# Patient Record
Sex: FEMALE | Race: OTHER
[De-identification: names, ages, dates, MRNs, and addresses within clinical notes are randomized per-mention and may not be internally consistent; named-entity substitution may affect disease eponyms.]

---

## 2019-11-15 PROBLEM — Z00.00 ENCOUNTER FOR PREVENTIVE HEALTH EXAMINATION: Status: ACTIVE | Noted: 2019-11-15

## 2019-11-22 ENCOUNTER — APPOINTMENT (OUTPATIENT)
Dept: NEUROLOGY | Facility: CLINIC | Age: 34
End: 2019-11-22
Payer: MEDICAID

## 2019-11-22 ENCOUNTER — TRANSCRIPTION ENCOUNTER (OUTPATIENT)
Age: 34
End: 2019-11-22

## 2019-11-22 VITALS
DIASTOLIC BLOOD PRESSURE: 78 MMHG | BODY MASS INDEX: 30.12 KG/M2 | HEART RATE: 94 BPM | HEIGHT: 63 IN | OXYGEN SATURATION: 98 % | WEIGHT: 170 LBS | SYSTOLIC BLOOD PRESSURE: 117 MMHG

## 2019-11-22 DIAGNOSIS — Z87.42 PERSONAL HISTORY OF OTHER DISEASES OF THE FEMALE GENITAL TRACT: ICD-10-CM

## 2019-11-22 DIAGNOSIS — Z78.9 OTHER SPECIFIED HEALTH STATUS: ICD-10-CM

## 2019-11-22 DIAGNOSIS — Z82.49 FAMILY HISTORY OF ISCHEMIC HEART DISEASE AND OTHER DISEASES OF THE CIRCULATORY SYSTEM: ICD-10-CM

## 2019-11-22 DIAGNOSIS — Z86.69 PERSONAL HISTORY OF OTHER DISEASES OF THE NERVOUS SYSTEM AND SENSE ORGANS: ICD-10-CM

## 2019-11-22 DIAGNOSIS — Z83.3 FAMILY HISTORY OF DIABETES MELLITUS: ICD-10-CM

## 2019-11-22 DIAGNOSIS — Z80.1 FAMILY HISTORY OF MALIGNANT NEOPLASM OF TRACHEA, BRONCHUS AND LUNG: ICD-10-CM

## 2019-11-22 DIAGNOSIS — D35.2 BENIGN NEOPLASM OF PITUITARY GLAND: ICD-10-CM

## 2019-11-22 DIAGNOSIS — Z87.898 PERSONAL HISTORY OF OTHER SPECIFIED CONDITIONS: ICD-10-CM

## 2019-11-22 DIAGNOSIS — M54.16 RADICULOPATHY, LUMBAR REGION: ICD-10-CM

## 2019-11-22 DIAGNOSIS — M54.12 RADICULOPATHY, CERVICAL REGION: ICD-10-CM

## 2019-11-22 PROCEDURE — 99203 OFFICE O/P NEW LOW 30 MIN: CPT

## 2019-11-22 NOTE — ASSESSMENT
[FreeTextEntry1] : At this time she will proceed with MRI of her head including pituitary ENT referral endocrine evaluation and ophthalmology referral. She will then see her head and neck surgeon to determine whether any surgery is medically advisable.\par \par Related to her neck and low back symptoms she will have EMG studies performed in her upper and lower extremities. She has no evidence on clinical exam indicate myelopathy she may have an underlying radiculopathy that will be investigated with EMG testing.

## 2019-11-22 NOTE — HISTORY OF PRESENT ILLNESS
[FreeTextEntry1] : 32 through woman with a history of a pituitary tumor she is now having visual complaints. She has had in addition MRIs of the cervical lumbar spine for which I have reviewed. 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 C. multiple copies of MRIs including her brain cervical and lumbar spine she's had EMG studies in the past showing evidence of lumbar radiculopathy.

## 2019-11-22 NOTE — CONSULT LETTER
[Dear  ___] : Dear  [unfilled], [Courtesy Letter:] : I had the pleasure of seeing your patient, [unfilled], in my office today. [Please see my note below.] : Please see my note below. [Sincerely,] : Sincerely, [FreeTextEntry3] : Bruno Machuca MD\par  Professor of Neurology\par  Director of Neurology Outreach Programs \par  [facility]\par

## 2019-11-22 NOTE — PHYSICAL EXAM
[FreeTextEntry1] : The patient is awake alert answering appropriately without evidence of a facial dysarthria.\par \par Cranial nerves show full extraocular movements fundi benign is no facial asymmetry lower cranial nerves are normal.\par \par The gait appears to be normal reflexes coordination sensation mental status are all within normal limits without evidence of cranial or carotid bruits.

## 2019-12-09 ENCOUNTER — APPOINTMENT (OUTPATIENT)
Dept: OTOLARYNGOLOGY | Facility: CLINIC | Age: 34
End: 2019-12-09

## 2022-09-29 ENCOUNTER — EMERGENCY (EMERGENCY)
Facility: HOSPITAL | Age: 37
LOS: 1 days | Discharge: HOME | End: 2022-09-29
Attending: EMERGENCY MEDICINE | Admitting: EMERGENCY MEDICINE

## 2022-09-29 VITALS
DIASTOLIC BLOOD PRESSURE: 80 MMHG | OXYGEN SATURATION: 98 % | TEMPERATURE: 98 F | HEART RATE: 93 BPM | RESPIRATION RATE: 18 BRPM | WEIGHT: 184.97 LBS | SYSTOLIC BLOOD PRESSURE: 131 MMHG

## 2022-09-29 VITALS
SYSTOLIC BLOOD PRESSURE: 128 MMHG | HEART RATE: 76 BPM | RESPIRATION RATE: 18 BRPM | OXYGEN SATURATION: 98 % | DIASTOLIC BLOOD PRESSURE: 78 MMHG

## 2022-09-29 DIAGNOSIS — R10.9 UNSPECIFIED ABDOMINAL PAIN: ICD-10-CM

## 2022-09-29 DIAGNOSIS — N12 TUBULO-INTERSTITIAL NEPHRITIS, NOT SPECIFIED AS ACUTE OR CHRONIC: ICD-10-CM

## 2022-09-29 DIAGNOSIS — Z20.822 CONTACT WITH AND (SUSPECTED) EXPOSURE TO COVID-19: ICD-10-CM

## 2022-09-29 LAB
ANION GAP SERPL CALC-SCNC: 8 MMOL/L — LOW (ref 9–16)
APPEARANCE UR: CLEAR — SIGNIFICANT CHANGE UP
BACTERIA # UR AUTO: ABNORMAL /HPF
BILIRUB UR-MCNC: NEGATIVE — SIGNIFICANT CHANGE UP
BUN SERPL-MCNC: 12 MG/DL — SIGNIFICANT CHANGE UP (ref 7–23)
CALCIUM SERPL-MCNC: 8.6 MG/DL — SIGNIFICANT CHANGE UP (ref 8.5–10.5)
CHLORIDE SERPL-SCNC: 105 MMOL/L — SIGNIFICANT CHANGE UP (ref 96–108)
CO2 SERPL-SCNC: 26 MMOL/L — SIGNIFICANT CHANGE UP (ref 22–31)
COLOR SPEC: YELLOW — SIGNIFICANT CHANGE UP
CREAT SERPL-MCNC: 0.95 MG/DL — SIGNIFICANT CHANGE UP (ref 0.5–1.3)
DIFF PNL FLD: ABNORMAL
EGFR: 80 ML/MIN/1.73M2 — SIGNIFICANT CHANGE UP
GLUCOSE SERPL-MCNC: 98 MG/DL — SIGNIFICANT CHANGE UP (ref 70–99)
GLUCOSE UR QL: NEGATIVE — SIGNIFICANT CHANGE UP
HCG UR QL: NEGATIVE — SIGNIFICANT CHANGE UP
HCT VFR BLD CALC: 39.6 % — SIGNIFICANT CHANGE UP (ref 34.5–45)
HGB BLD-MCNC: 13 G/DL — SIGNIFICANT CHANGE UP (ref 11.5–15.5)
KETONES UR-MCNC: ABNORMAL MG/DL
LEUKOCYTE ESTERASE UR-ACNC: ABNORMAL
MCHC RBC-ENTMCNC: 29.4 PG — SIGNIFICANT CHANGE UP (ref 27–34)
MCHC RBC-ENTMCNC: 32.8 GM/DL — SIGNIFICANT CHANGE UP (ref 32–36)
MCV RBC AUTO: 89.6 FL — SIGNIFICANT CHANGE UP (ref 80–100)
NITRITE UR-MCNC: NEGATIVE — SIGNIFICANT CHANGE UP
NRBC # BLD: 0 /100 WBCS — SIGNIFICANT CHANGE UP (ref 0–0)
PH UR: 7 — SIGNIFICANT CHANGE UP (ref 5–8)
PLATELET # BLD AUTO: 219 K/UL — SIGNIFICANT CHANGE UP (ref 150–400)
POTASSIUM SERPL-MCNC: 4 MMOL/L — SIGNIFICANT CHANGE UP (ref 3.5–5.3)
POTASSIUM SERPL-SCNC: 4 MMOL/L — SIGNIFICANT CHANGE UP (ref 3.5–5.3)
PROT UR-MCNC: 100 MG/DL
RBC # BLD: 4.42 M/UL — SIGNIFICANT CHANGE UP (ref 3.8–5.2)
RBC # FLD: 12.7 % — SIGNIFICANT CHANGE UP (ref 10.3–14.5)
RBC CASTS # UR COMP ASSIST: SIGNIFICANT CHANGE UP /HPF
SARS-COV-2 RNA SPEC QL NAA+PROBE: SIGNIFICANT CHANGE UP
SODIUM SERPL-SCNC: 139 MMOL/L — SIGNIFICANT CHANGE UP (ref 132–145)
SP GR SPEC: 1.02 — SIGNIFICANT CHANGE UP (ref 1–1.03)
UROBILINOGEN FLD QL: 0.2 E.U./DL — SIGNIFICANT CHANGE UP
WBC # BLD: 13.45 K/UL — HIGH (ref 3.8–10.5)
WBC # FLD AUTO: 13.45 K/UL — HIGH (ref 3.8–10.5)
WBC UR QL: ABNORMAL /HPF

## 2022-09-29 PROCEDURE — 76770 US EXAM ABDO BACK WALL COMP: CPT | Mod: 26

## 2022-09-29 PROCEDURE — 99285 EMERGENCY DEPT VISIT HI MDM: CPT

## 2022-09-29 RX ORDER — CEFTRIAXONE 500 MG/1
1000 INJECTION, POWDER, FOR SOLUTION INTRAMUSCULAR; INTRAVENOUS ONCE
Refills: 0 | Status: COMPLETED | OUTPATIENT
Start: 2022-09-29 | End: 2022-09-29

## 2022-09-29 RX ORDER — CEFUROXIME AXETIL 250 MG
1 TABLET ORAL
Qty: 20 | Refills: 0
Start: 2022-09-29 | End: 2022-10-08

## 2022-09-29 RX ORDER — SODIUM CHLORIDE 9 MG/ML
1000 INJECTION INTRAMUSCULAR; INTRAVENOUS; SUBCUTANEOUS ONCE
Refills: 0 | Status: COMPLETED | OUTPATIENT
Start: 2022-09-29 | End: 2022-09-29

## 2022-09-29 RX ORDER — PHENAZOPYRIDINE HCL 100 MG
200 TABLET ORAL ONCE
Refills: 0 | Status: COMPLETED | OUTPATIENT
Start: 2022-09-29 | End: 2022-09-29

## 2022-09-29 RX ORDER — ONDANSETRON 8 MG/1
1 TABLET, FILM COATED ORAL
Qty: 12 | Refills: 0
Start: 2022-09-29 | End: 2022-10-02

## 2022-09-29 RX ORDER — PHENAZOPYRIDINE HCL 100 MG
1 TABLET ORAL
Qty: 4 | Refills: 0
Start: 2022-09-29 | End: 2022-09-30

## 2022-09-29 RX ORDER — ACETAMINOPHEN 500 MG
650 TABLET ORAL ONCE
Refills: 0 | Status: COMPLETED | OUTPATIENT
Start: 2022-09-29 | End: 2022-09-29

## 2022-09-29 RX ORDER — KETOROLAC TROMETHAMINE 30 MG/ML
15 SYRINGE (ML) INJECTION ONCE
Refills: 0 | Status: DISCONTINUED | OUTPATIENT
Start: 2022-09-29 | End: 2022-09-29

## 2022-09-29 RX ORDER — ONDANSETRON 8 MG/1
4 TABLET, FILM COATED ORAL ONCE
Refills: 0 | Status: COMPLETED | OUTPATIENT
Start: 2022-09-29 | End: 2022-09-29

## 2022-09-29 RX ADMIN — ONDANSETRON 4 MILLIGRAM(S): 8 TABLET, FILM COATED ORAL at 12:13

## 2022-09-29 RX ADMIN — Medication 200 MILLIGRAM(S): at 12:51

## 2022-09-29 RX ADMIN — SODIUM CHLORIDE 1000 MILLILITER(S): 9 INJECTION INTRAMUSCULAR; INTRAVENOUS; SUBCUTANEOUS at 11:41

## 2022-09-29 RX ADMIN — CEFTRIAXONE 100 MILLIGRAM(S): 500 INJECTION, POWDER, FOR SOLUTION INTRAMUSCULAR; INTRAVENOUS at 12:52

## 2022-09-29 RX ADMIN — Medication 15 MILLIGRAM(S): at 12:52

## 2022-09-29 RX ADMIN — Medication 650 MILLIGRAM(S): at 12:14

## 2022-09-29 NOTE — ED PROVIDER NOTE - NS ED ROS FT
Constitutional: +fever, +generalized malaise  HEENT:  No change in vision, no discharge, no congestion  Cardiac:  No chest pain, palpitations  Pulm:  No cough, no sob  GI:  No abd pain, +nausea +right flank pain  : +dysuria +hematuria   Neuro:  No ha, no neck pain, no numbness, no weakness, no change in speech, vision or gait  MSK:  No joint pain or swelling  Skin:  No rash  Heme:  No abnormal bruising  Psychiatric: No suicidal or homicidal ideation, no hallucinations

## 2022-09-29 NOTE — ED PROVIDER NOTE - NSFOLLOWUPINSTRUCTIONS_ED_ALL_ED_FT
SEE YOUR DOCTOR MONDAY     TAKE ALL OF THE ANTIBIOTICS EVEN IF YOU FEEL BETTER IN 2-3 DAYS    TAKE MOTRIN 600 MG EVERY 8 HOURS AS NEEDED FOR PAIN.  YOU CAN ALSO TAKE TYLENOL ACCORDING TO THE BOTTLE INSTRUCTIONS.    DRINK PLENTY OF FLUIDS AND REST AS NEEDED    RETURN TO THE EMERGENCY ROOM IMMEDIATELY FOR:  FEVER  VOMITING  WORSENING PAIN  SHAKING CHILLS  YOU FEEL WEAK, DIZZY OR ILL       Pyelonephritis, Adult    Body outline showing the urinary system, with a close-up of a normal kidney and an infected kidney.   Pyelonephritis is an infection that occurs in the kidney. The kidneys are the organs that filter a person's blood and move waste out of the bloodstream and into the urine. Urine passes from the kidneys, through tubes called ureters, and into the bladder. There are two main types of pyelonephritis:  •Infections that come on quickly without any warning (acute pyelonephritis).      •Infections that last for a long period of time (chronic pyelonephritis).      In most cases, the infection clears up with treatment and does not cause further problems. More severe infections or chronic infections can sometimes spread to the bloodstream or lead to other problems with the kidneys.      What are the causes?    This condition is usually caused by:  •Bacteria traveling from the bladder up to the kidney. This may occur after having a bladder infection (cystitis) or urinary tract infection (UTI).      •Bladder infections caused from bacteria traveling from the bloodstream to the kidney.        What increases the risk?    This condition is more likely to develop in:  •Pregnant women.      •Older people.    •People who have any of these conditions:  •Diabetes.      •Inflammation of the prostate gland (prostatitis), in males.      •Kidney stones or bladder stones.      •Other abnormalities of the kidney or ureter.      •Cancer.        •People who have a catheter placed in the bladder.      •People who are sexually active.      •Women who use spermicides.      •People who have had a prior UTI.        What are the signs or symptoms?    Symptoms of this condition include:  •Frequent urination.      •Strong or persistent urge to urinate.      •Burning or stinging when urinating.      •Abdominal pain.      •Back pain.      •Pain in the side or flank area.      •Fever or chills.      •Blood in the urine, or dark urine.      •Nausea or vomiting.        How is this diagnosed?    This condition may be diagnosed based on:  •Your medical history and a physical exam.      •Urine tests.      •Blood tests.      You may also have imaging tests of the kidneys, such as an ultrasound or CT scan.      How is this treated?    Treatment for this condition may depend on the severity of the infection.  •If the infection is mild and is found early, you may be treated with antibiotic medicines taken by mouth (orally). You will need to drink fluids to remain hydrated.      •If the infection is more severe, you may need to stay in the hospital and receive antibiotics given directly into a vein through an IV. You may also need to receive fluids through an IV if you are not able to remain hydrated. After your hospital stay, you may need to take oral antibiotics for a period of time.      Other treatments may be required, depending on the cause of the infection.      Follow these instructions at home:    Medicines     •Take your antibiotic medicine as told by your health care provider. Do not stop taking the antibiotic even if you start to feel better.      •Take over-the-counter and prescription medicines only as told by your health care provider.        General instructions   Three cups showing dark yellow, yellow, and pale yellow urine.   •Drink enough fluid to keep your urine pale yellow.      •Avoid caffeine, tea, and carbonated beverages. They tend to irritate the bladder.      •Urinate often. Avoid holding in urine for long periods of time.      •Urinate before and after sex.      •After a bowel movement, women should cleanse from front to back. Use each tissue only once.      •Keep all follow-up visits as told by your health care provider. This is important.        Contact a health care provider if:    •Your symptoms do not get better after 2 days of treatment.      •Your symptoms get worse.      •You have a fever.        Get help right away if you:    •Are unable to take your antibiotics or fluids.      •Have shaking chills.      •Vomit.      •Have severe flank or back pain.      •Have extreme weakness or fainting.        Summary    •Pyelonephritis is a urinary tract infection (UTI) that occurs in the kidney.      •Treatment for this condition may depend on the severity of the infection.      •Take your antibiotic medicine as told by your health care provider. Do not stop taking the antibiotic even if you start to feel better.      •Drink enough fluid to keep your urine pale yellow.      •Keep all follow-up visits as told by your health care provider. This is important.      This information is not intended to replace advice given to you by your health care provider. Make sure you discuss any questions you have with your health care provider.    ANY NEW, WORSENING OR CONCERNING SYMPTOMS

## 2022-09-29 NOTE — ED PROVIDER NOTE - PATIENT PORTAL LINK FT
You can access the FollowMyHealth Patient Portal offered by [facility] by registering at the following website: [email or web address]. By joining [employer]’s FollowMyHealth portal, you will also be able to view your health information using other applications (apps) compatible with our system.

## 2022-09-29 NOTE — ED PROVIDER NOTE - PHYSICAL EXAMINATION
General:  Well appearing, no respiratory distress  HEENT:  No conjunctival injection, no ocular discharge, external ears WNL, no pharyngeal injection or exudates  Chest:  Non-tender, no crepitance  Lungs:  Clear to auscultation bilaterally   Heart:  s1s2 normal, no murmur  Abdomen:  soft, suprapubic tenderness  Back: Mild right CVAT  :  Deferred  Rectal:  Deferred  Extremities: No edema, normal perfusion, no joint swelling or tenderness  Neuro:  Alert and oriented x 3, cn2-12 intact, full strength, sensory grossly intact, normal gait  Psychiatry:  Calm, cooperative, no expression of suicidal or homicidal ideation

## 2022-09-29 NOTE — ED PROVIDER NOTE - CLINICAL SUMMARY MEDICAL DECISION MAKING FREE TEXT BOX
38 yo F with dysuria, hematuria and flank pain.  Symptoms most c/w pyelonephritis.  will give meds for symptom relief, check studies, reassess.    Discussed results, dx, treatment and fu plan in detail with patient.  Advised return for worsening pain, vomiting, problems taking the medicine or any other new, worsening or concerning symptoms and patient verbalized understanding of discussion and plan.

## 2022-09-29 NOTE — ED PROVIDER NOTE - OBJECTIVE STATEMENT
Patient has been having dysuria, hematuria right flank pain  Symptoms started yesterday, worse today  Subjective fever  Nausea

## 2022-09-29 NOTE — ED ADULT NURSE NOTE - OBJECTIVE STATEMENT
38 y/o female who is here for right flank pain, dysuria, hematuria and subjective fever- had a IUD recently placed,

## 2022-09-29 NOTE — ED ADULT TRIAGE NOTE - CHIEF COMPLAINT QUOTE
here for dysuria, right flank pain, subjective fever, hematuria had a IUD placed earlier this month -

## 2022-09-30 LAB
CULTURE RESULTS: SIGNIFICANT CHANGE UP
SPECIMEN SOURCE: SIGNIFICANT CHANGE UP

## 2022-10-11 ENCOUNTER — EMERGENCY (EMERGENCY)
Facility: HOSPITAL | Age: 37
LOS: 1 days | Discharge: HOME | End: 2022-10-11
Admitting: EMERGENCY MEDICINE
Payer: MEDICAID

## 2022-10-11 VITALS
WEIGHT: 169.98 LBS | TEMPERATURE: 99 F | DIASTOLIC BLOOD PRESSURE: 86 MMHG | RESPIRATION RATE: 18 BRPM | SYSTOLIC BLOOD PRESSURE: 129 MMHG | HEART RATE: 78 BPM | OXYGEN SATURATION: 99 %

## 2022-10-11 DIAGNOSIS — R42 DIZZINESS AND GIDDINESS: ICD-10-CM

## 2022-10-11 DIAGNOSIS — Z86.018 PERSONAL HISTORY OF OTHER BENIGN NEOPLASM: ICD-10-CM

## 2022-10-11 DIAGNOSIS — R06.02 SHORTNESS OF BREATH: ICD-10-CM

## 2022-10-11 DIAGNOSIS — Z87.19 PERSONAL HISTORY OF OTHER DISEASES OF THE DIGESTIVE SYSTEM: ICD-10-CM

## 2022-10-11 DIAGNOSIS — R51.9 HEADACHE, UNSPECIFIED: ICD-10-CM

## 2022-10-11 DIAGNOSIS — Z20.822 CONTACT WITH AND (SUSPECTED) EXPOSURE TO COVID-19: ICD-10-CM

## 2022-10-11 DIAGNOSIS — R07.9 CHEST PAIN, UNSPECIFIED: ICD-10-CM

## 2022-10-11 DIAGNOSIS — Z87.448 PERSONAL HISTORY OF OTHER DISEASES OF URINARY SYSTEM: ICD-10-CM

## 2022-10-11 DIAGNOSIS — H53.8 OTHER VISUAL DISTURBANCES: ICD-10-CM

## 2022-10-11 DIAGNOSIS — K64.4 RESIDUAL HEMORRHOIDAL SKIN TAGS: ICD-10-CM

## 2022-10-11 LAB
ALBUMIN SERPL ELPH-MCNC: 3.4 G/DL — SIGNIFICANT CHANGE UP (ref 3.4–5)
ALP SERPL-CCNC: 67 U/L — SIGNIFICANT CHANGE UP (ref 40–120)
ALT FLD-CCNC: 31 U/L — SIGNIFICANT CHANGE UP (ref 12–42)
ANION GAP SERPL CALC-SCNC: 5 MMOL/L — LOW (ref 9–16)
APPEARANCE UR: CLEAR — SIGNIFICANT CHANGE UP
APTT BLD: 28.6 SEC — SIGNIFICANT CHANGE UP (ref 27.5–35.5)
AST SERPL-CCNC: 44 U/L — HIGH (ref 15–37)
BASOPHILS # BLD AUTO: 0.04 K/UL — SIGNIFICANT CHANGE UP (ref 0–0.2)
BASOPHILS NFR BLD AUTO: 0.5 % — SIGNIFICANT CHANGE UP (ref 0–2)
BILIRUB DIRECT SERPL-MCNC: SIGNIFICANT CHANGE UP MG/DL (ref 0–0.3)
BILIRUB INDIRECT FLD-MCNC: SIGNIFICANT CHANGE UP MG/DL (ref 0.2–1)
BILIRUB SERPL-MCNC: 0.4 MG/DL — SIGNIFICANT CHANGE UP (ref 0.2–1.2)
BILIRUB SERPL-MCNC: 0.4 MG/DL — SIGNIFICANT CHANGE UP (ref 0.2–1.2)
BILIRUB UR-MCNC: NEGATIVE — SIGNIFICANT CHANGE UP
BUN SERPL-MCNC: 14 MG/DL — SIGNIFICANT CHANGE UP (ref 7–23)
CALCIUM SERPL-MCNC: 9.1 MG/DL — SIGNIFICANT CHANGE UP (ref 8.5–10.5)
CHLORIDE SERPL-SCNC: 106 MMOL/L — SIGNIFICANT CHANGE UP (ref 96–108)
CO2 SERPL-SCNC: 26 MMOL/L — SIGNIFICANT CHANGE UP (ref 22–31)
COLOR SPEC: YELLOW — SIGNIFICANT CHANGE UP
CREAT SERPL-MCNC: 0.69 MG/DL — SIGNIFICANT CHANGE UP (ref 0.5–1.3)
DIFF PNL FLD: ABNORMAL
EGFR: 116 ML/MIN/1.73M2 — SIGNIFICANT CHANGE UP
EOSINOPHIL # BLD AUTO: 0.14 K/UL — SIGNIFICANT CHANGE UP (ref 0–0.5)
EOSINOPHIL NFR BLD AUTO: 1.7 % — SIGNIFICANT CHANGE UP (ref 0–6)
FLUAV AG NPH QL: SIGNIFICANT CHANGE UP
FLUBV AG NPH QL: SIGNIFICANT CHANGE UP
GLUCOSE SERPL-MCNC: 97 MG/DL — SIGNIFICANT CHANGE UP (ref 70–99)
GLUCOSE UR QL: NEGATIVE — SIGNIFICANT CHANGE UP
HCG SERPL-ACNC: <1 MIU/ML — SIGNIFICANT CHANGE UP
HCT VFR BLD CALC: 37.9 % — SIGNIFICANT CHANGE UP (ref 34.5–45)
HGB BLD-MCNC: 12.5 G/DL — SIGNIFICANT CHANGE UP (ref 11.5–15.5)
IMM GRANULOCYTES NFR BLD AUTO: 0.2 % — SIGNIFICANT CHANGE UP (ref 0–0.9)
INR BLD: 0.97 — SIGNIFICANT CHANGE UP (ref 0.88–1.16)
KETONES UR-MCNC: NEGATIVE — SIGNIFICANT CHANGE UP
LACTATE SERPL-SCNC: 1 MMOL/L — SIGNIFICANT CHANGE UP (ref 0.4–2)
LEUKOCYTE ESTERASE UR-ACNC: NEGATIVE — SIGNIFICANT CHANGE UP
LIDOCAIN IGE QN: 125 U/L — SIGNIFICANT CHANGE UP (ref 73–393)
LYMPHOCYTES # BLD AUTO: 2.86 K/UL — SIGNIFICANT CHANGE UP (ref 1–3.3)
LYMPHOCYTES # BLD AUTO: 34.6 % — SIGNIFICANT CHANGE UP (ref 13–44)
MAGNESIUM SERPL-MCNC: 2.1 MG/DL — SIGNIFICANT CHANGE UP (ref 1.6–2.6)
MCHC RBC-ENTMCNC: 29.6 PG — SIGNIFICANT CHANGE UP (ref 27–34)
MCHC RBC-ENTMCNC: 33 GM/DL — SIGNIFICANT CHANGE UP (ref 32–36)
MCV RBC AUTO: 89.8 FL — SIGNIFICANT CHANGE UP (ref 80–100)
MONOCYTES # BLD AUTO: 0.43 K/UL — SIGNIFICANT CHANGE UP (ref 0–0.9)
MONOCYTES NFR BLD AUTO: 5.2 % — SIGNIFICANT CHANGE UP (ref 2–14)
NEUTROPHILS # BLD AUTO: 4.78 K/UL — SIGNIFICANT CHANGE UP (ref 1.8–7.4)
NEUTROPHILS NFR BLD AUTO: 57.8 % — SIGNIFICANT CHANGE UP (ref 43–77)
NITRITE UR-MCNC: NEGATIVE — SIGNIFICANT CHANGE UP
NRBC # BLD: 0 /100 WBCS — SIGNIFICANT CHANGE UP (ref 0–0)
PH UR: 6 — SIGNIFICANT CHANGE UP (ref 5–8)
PLATELET # BLD AUTO: 232 K/UL — SIGNIFICANT CHANGE UP (ref 150–400)
POTASSIUM SERPL-MCNC: 5.3 MMOL/L — SIGNIFICANT CHANGE UP (ref 3.5–5.3)
POTASSIUM SERPL-SCNC: 5.3 MMOL/L — SIGNIFICANT CHANGE UP (ref 3.5–5.3)
PROT SERPL-MCNC: 7.7 G/DL — SIGNIFICANT CHANGE UP (ref 6.4–8.2)
PROT UR-MCNC: NEGATIVE MG/DL — SIGNIFICANT CHANGE UP
PROTHROM AB SERPL-ACNC: 11.2 SEC — SIGNIFICANT CHANGE UP (ref 10.5–13.4)
RBC # BLD: 4.22 M/UL — SIGNIFICANT CHANGE UP (ref 3.8–5.2)
RBC # FLD: 13 % — SIGNIFICANT CHANGE UP (ref 10.3–14.5)
RBC CASTS # UR COMP ASSIST: < 5 /HPF — SIGNIFICANT CHANGE UP
RSV RNA NPH QL NAA+NON-PROBE: SIGNIFICANT CHANGE UP
SARS-COV-2 RNA SPEC QL NAA+PROBE: SIGNIFICANT CHANGE UP
SODIUM SERPL-SCNC: 137 MMOL/L — SIGNIFICANT CHANGE UP (ref 132–145)
SP GR SPEC: <=1.005 — SIGNIFICANT CHANGE UP (ref 1–1.03)
TROPONIN I, HIGH SENSITIVITY RESULT: 4.2 NG/L — SIGNIFICANT CHANGE UP
UROBILINOGEN FLD QL: 0.2 E.U./DL — SIGNIFICANT CHANGE UP
WBC # BLD: 8.27 K/UL — SIGNIFICANT CHANGE UP (ref 3.8–10.5)
WBC # FLD AUTO: 8.27 K/UL — SIGNIFICANT CHANGE UP (ref 3.8–10.5)
WBC UR QL: < 5 /HPF — SIGNIFICANT CHANGE UP

## 2022-10-11 PROCEDURE — 70450 CT HEAD/BRAIN W/O DYE: CPT | Mod: 26

## 2022-10-11 PROCEDURE — 71046 X-RAY EXAM CHEST 2 VIEWS: CPT | Mod: 26

## 2022-10-11 PROCEDURE — 74177 CT ABD & PELVIS W/CONTRAST: CPT | Mod: 26

## 2022-10-11 PROCEDURE — 99285 EMERGENCY DEPT VISIT HI MDM: CPT

## 2022-10-11 RX ORDER — ACETAMINOPHEN 500 MG
650 TABLET ORAL ONCE
Refills: 0 | Status: COMPLETED | OUTPATIENT
Start: 2022-10-11 | End: 2022-10-11

## 2022-10-11 RX ORDER — IOHEXOL 300 MG/ML
30 INJECTION, SOLUTION INTRAVENOUS ONCE
Refills: 0 | Status: DISCONTINUED | OUTPATIENT
Start: 2022-10-11 | End: 2022-10-11

## 2022-10-11 RX ORDER — ONDANSETRON 8 MG/1
4 TABLET, FILM COATED ORAL ONCE
Refills: 0 | Status: COMPLETED | OUTPATIENT
Start: 2022-10-11 | End: 2022-10-11

## 2022-10-11 RX ORDER — KETOROLAC TROMETHAMINE 30 MG/ML
15 SYRINGE (ML) INJECTION ONCE
Refills: 0 | Status: DISCONTINUED | OUTPATIENT
Start: 2022-10-11 | End: 2022-10-11

## 2022-10-11 RX ORDER — DIATRIZOATE MEGLUMINE 180 MG/ML
30 INJECTION, SOLUTION INTRAVESICAL ONCE
Refills: 0 | Status: COMPLETED | OUTPATIENT
Start: 2022-10-11 | End: 2022-10-11

## 2022-10-11 RX ORDER — MECLIZINE HCL 12.5 MG
25 TABLET ORAL ONCE
Refills: 0 | Status: COMPLETED | OUTPATIENT
Start: 2022-10-11 | End: 2022-10-11

## 2022-10-11 RX ORDER — SODIUM CHLORIDE 9 MG/ML
1000 INJECTION INTRAMUSCULAR; INTRAVENOUS; SUBCUTANEOUS ONCE
Refills: 0 | Status: COMPLETED | OUTPATIENT
Start: 2022-10-11 | End: 2022-10-11

## 2022-10-11 RX ADMIN — DIATRIZOATE MEGLUMINE 30 MILLILITER(S): 180 INJECTION, SOLUTION INTRAVESICAL at 20:57

## 2022-10-11 RX ADMIN — ONDANSETRON 4 MILLIGRAM(S): 8 TABLET, FILM COATED ORAL at 20:56

## 2022-10-11 RX ADMIN — Medication 15 MILLIGRAM(S): at 23:53

## 2022-10-11 RX ADMIN — SODIUM CHLORIDE 1000 MILLILITER(S): 9 INJECTION INTRAMUSCULAR; INTRAVENOUS; SUBCUTANEOUS at 20:57

## 2022-10-11 RX ADMIN — Medication 650 MILLIGRAM(S): at 23:51

## 2022-10-11 RX ADMIN — Medication 25 MILLIGRAM(S): at 23:50

## 2022-10-11 NOTE — ED PROVIDER NOTE - OBJECTIVE STATEMENT
hx of pituitary cyst, last ct in january  treated for pyelo recently with ceftin? (check this), finished course sunday, felt better x 2 days then persistent ssx except urinary ssx  c/o nausea, emesis x 4 episodes per day, maintaining PO, right sided abd pain with back pain  also c/o chest pain, intermittent, not exertional since monday as well as sob, not related to cp  also c/o HA- all the time, takes tylenol and motrin, last dose tylenol at 5 PM, dizziness, b/l blurry vision   melena x 1 episode prior to arrival, hx of gastritis but no PUD 38 y/o F with PMHx of pituitary cyst presents c/o multiple symptoms including HA, dizziness, cp and abdominal pain with n/v x 3-5 days. Pt reports that she was last seen in this ER on 9/29 c/o urinary ssx, right sided abd and back pain, treated for pyelonephritis with ceftin x 10 days which she completed on 10/2. Pt reports initial improvement in symptoms but then return of right sided abd and back pain, no return of urinary ssx. Pt reports recent nausea with 4 episodes of emesis per day the last few days, tolerating PO but decreased amounts. Pt is having daily BM w/o brbpr but does report 1 episode of melena prior to arrival. Pt also endorses intermittent chest pain since Monday, not worse with exertion or pleuritic in nature, occurring multiple times per day; no hx of prior ssx; no hx of cardiac stents, smoking or cocaine use. Pt reports frequent b/l frontal HA x months for which she takes tylenol and motrin, last dose of motrin at 5 PM. Pt also reports associated dizziness, worse with ambulation, and b/l blurry vision that comes and goes. Currently pt primary complaint is HA, dizziness and persistent abd pain with nausea. Pt denies prior abd surgeries.

## 2022-10-11 NOTE — ED PROVIDER NOTE - PHYSICAL EXAMINATION
Pt well-appearing in NAD, VSS  Neck supple, no pain with FROM, no meningeal signs  Heart rrr, lungs cta b/l  Abd soft, mild right sided TTP w/o peritoneal signs, negative clay's sign  No facial droop, no pronator drift, no difficulty speaking or dysarthria, no hemineglect  TIFFANY, EOM intact with no nystagmus but reproducible dizziness  B/l UE and LE with strength 5/5, intact gross sensation, intact coordination with finger to nose b/l, negative romberg, ambulating with steady gait

## 2022-10-11 NOTE — ED PROVIDER NOTE - RECTAL
External, non thrombosed hemorrhoids visualized, no internal hemorrhoids or masses palpated/HEMORRHOIDS

## 2022-10-11 NOTE — ED PROVIDER NOTE - BOWEL SOUNDS
present x 4 quadrants Sski Pregnancy And Lactation Text: This medication is Pregnancy Category D and isn't considered safe during pregnancy. It is excreted in breast milk.

## 2022-10-11 NOTE — ED ADULT NURSE NOTE - OBJECTIVE STATEMENT
Pt reports she finished abx for kidney infection on Sunday but has had 4 days of constant dizziness, HA, blurred vision, n/v and intermittent CP. Pt to bathroom to provide urine sample, IV and blood work obtained. Pt taking Tylenol with no relief of pain or symptoms.

## 2022-10-11 NOTE — ED PROVIDER NOTE - BILATERAL EYES
EOM intact with no nystagmus visualized but reproducible dizziness with lateral movement/clear/pupils equal, round, and reactive to light

## 2022-10-11 NOTE — ED PROVIDER NOTE - ABDOMINAL EXAM
Mild right sided tenderness, no peritoneal signs, negative clay's sign, no cva ttp/soft/nondistended

## 2022-10-11 NOTE — ED ADULT TRIAGE NOTE - CHIEF COMPLAINT QUOTE
here for near syncope, c/o sob, dizziness and chest pain x 4 days - was seen at [facility] for the same complaints- LMP is current

## 2022-10-11 NOTE — ED PROVIDER NOTE - PATIENT PORTAL LINK FT
You can access the FollowMyHealth Patient Portal offered by [facility] by registering at the following website: [email or web address]. By joining [employer]’s FollowMyHealth portal, you will also be able to view your health information using other applications (apps) compatible with our system.

## 2022-10-11 NOTE — ED PROVIDER NOTE - CLINICAL SUMMARY MEDICAL DECISION MAKING FREE TEXT BOX
38 y/o F with PMHx of pituitary cyst, recent ER visit for pyelonephritis (tx with ceftin x 10 days) presents c/o recurrent ssx after finishing ceftin, c/o persistent right sided abd and back pain with nausea and emesis as well as new onset cp. Pt notes chronic HA for which she takes a lot of tylenol and advil but worse today with associated blurry vision and dizziness, worse with ambulation, occurring the last few days. Pt notes 1 episode of melena prior to arrival, notes hx of gastritis but no PUD. Exam as above- well-appearing in NAD, no meningeal signs, heart rrr, lungs cta b/l, abd soft with mild right sided TTP w/o clay's sign, no cva ttp, neuro exam w/o focal deficit but reproducible dizziness with lateral EOM. Concern for persistent abd pain, n/v- assess for persistent pyelonephritis as well as appendicitis, hepatobiliary pathology vs increased ICP given hx of mass. Concern for HA, dizziness, intermittent b/l blurry vision- may be ocular migraine vs BPPV, also assess for mass, ICH vs unlikely meningitis, dissection. Concern for cp- r/o ACS, PNA, PTX. Also states episode of melena- assess hemoglobin level, no findings on exam, recommend to stop nsaids at this time, f/u GI. Plan for labs with cbc, cmp, lipase, mag, bhcg, trop x 1, covid/flu/rsv, UA, EKG, CXR, CT head, CTAP with contrast (pt unable to tolerate PO contrast). Will provide IVF, zofran, pain control. Reassess.

## 2022-10-12 VITALS
TEMPERATURE: 99 F | RESPIRATION RATE: 18 BRPM | HEART RATE: 61 BPM | DIASTOLIC BLOOD PRESSURE: 63 MMHG | OXYGEN SATURATION: 98 % | SYSTOLIC BLOOD PRESSURE: 104 MMHG

## 2022-10-12 LAB
CULTURE RESULTS: SIGNIFICANT CHANGE UP
SPECIMEN SOURCE: SIGNIFICANT CHANGE UP

## 2024-10-17 ENCOUNTER — EMERGENCY (EMERGENCY)
Facility: HOSPITAL | Age: 39
LOS: 1 days | Discharge: HOME | End: 2024-10-17
Admitting: EMERGENCY MEDICINE
Payer: COMMERCIAL

## 2024-10-17 VITALS
OXYGEN SATURATION: 99 % | DIASTOLIC BLOOD PRESSURE: 80 MMHG | HEART RATE: 74 BPM | SYSTOLIC BLOOD PRESSURE: 116 MMHG | RESPIRATION RATE: 18 BRPM | TEMPERATURE: 98 F

## 2024-10-17 VITALS
RESPIRATION RATE: 18 BRPM | HEART RATE: 72 BPM | TEMPERATURE: 98 F | DIASTOLIC BLOOD PRESSURE: 81 MMHG | OXYGEN SATURATION: 100 % | WEIGHT: 175.05 LBS | SYSTOLIC BLOOD PRESSURE: 145 MMHG | HEIGHT: 62 IN

## 2024-10-17 LAB
ALBUMIN SERPL ELPH-MCNC: 3.4 G/DL — SIGNIFICANT CHANGE UP (ref 3.4–5)
ALP SERPL-CCNC: 97 U/L — SIGNIFICANT CHANGE UP (ref 40–120)
ALT FLD-CCNC: 24 U/L — SIGNIFICANT CHANGE UP (ref 12–42)
ANION GAP SERPL CALC-SCNC: 9 MMOL/L — SIGNIFICANT CHANGE UP (ref 9–16)
AST SERPL-CCNC: 14 U/L — LOW (ref 15–37)
BILIRUB SERPL-MCNC: 0.2 MG/DL — SIGNIFICANT CHANGE UP (ref 0.2–1.2)
BUN SERPL-MCNC: 14 MG/DL — SIGNIFICANT CHANGE UP (ref 7–23)
CALCIUM SERPL-MCNC: 9 MG/DL — SIGNIFICANT CHANGE UP (ref 8.5–10.5)
CHLORIDE SERPL-SCNC: 106 MMOL/L — SIGNIFICANT CHANGE UP (ref 96–108)
CO2 SERPL-SCNC: 25 MMOL/L — SIGNIFICANT CHANGE UP (ref 22–31)
CREAT SERPL-MCNC: 0.87 MG/DL — SIGNIFICANT CHANGE UP (ref 0.5–1.3)
EGFR: 88 ML/MIN/1.73M2 — SIGNIFICANT CHANGE UP
GLUCOSE BLDC GLUCOMTR-MCNC: 110 MG/DL — HIGH (ref 70–99)
GLUCOSE SERPL-MCNC: 103 MG/DL — HIGH (ref 70–99)
HCG SERPL-ACNC: <1 MIU/ML — SIGNIFICANT CHANGE UP
HCT VFR BLD CALC: 38 % — SIGNIFICANT CHANGE UP (ref 34.5–45)
HGB BLD-MCNC: 12.6 G/DL — SIGNIFICANT CHANGE UP (ref 11.5–15.5)
MCHC RBC-ENTMCNC: 29.4 PG — SIGNIFICANT CHANGE UP (ref 27–34)
MCHC RBC-ENTMCNC: 33.2 GM/DL — SIGNIFICANT CHANGE UP (ref 32–36)
MCV RBC AUTO: 88.8 FL — SIGNIFICANT CHANGE UP (ref 80–100)
NRBC # BLD: 0 /100 WBCS — SIGNIFICANT CHANGE UP (ref 0–0)
NT-PROBNP SERPL-SCNC: 42 PG/ML — SIGNIFICANT CHANGE UP
PLATELET # BLD AUTO: 221 K/UL — SIGNIFICANT CHANGE UP (ref 150–400)
POTASSIUM SERPL-MCNC: 3.6 MMOL/L — SIGNIFICANT CHANGE UP (ref 3.5–5.3)
POTASSIUM SERPL-SCNC: 3.6 MMOL/L — SIGNIFICANT CHANGE UP (ref 3.5–5.3)
PROT SERPL-MCNC: 7.2 G/DL — SIGNIFICANT CHANGE UP (ref 6.4–8.2)
RBC # BLD: 4.28 M/UL — SIGNIFICANT CHANGE UP (ref 3.8–5.2)
RBC # FLD: 12.4 % — SIGNIFICANT CHANGE UP (ref 10.3–14.5)
SODIUM SERPL-SCNC: 140 MMOL/L — SIGNIFICANT CHANGE UP (ref 132–145)
TROPONIN I, HIGH SENSITIVITY RESULT: <4 NG/L — SIGNIFICANT CHANGE UP
WBC # BLD: 8.36 K/UL — SIGNIFICANT CHANGE UP (ref 3.8–10.5)
WBC # FLD AUTO: 8.36 K/UL — SIGNIFICANT CHANGE UP (ref 3.8–10.5)

## 2024-10-17 PROCEDURE — 70450 CT HEAD/BRAIN W/O DYE: CPT | Mod: 26,MC

## 2024-10-17 PROCEDURE — 71046 X-RAY EXAM CHEST 2 VIEWS: CPT | Mod: 26

## 2024-10-17 PROCEDURE — 99285 EMERGENCY DEPT VISIT HI MDM: CPT

## 2024-10-17 RX ORDER — MECLIZINE HYDROCLORIDE 25 MG/1
25 TABLET ORAL ONCE
Refills: 0 | Status: COMPLETED | OUTPATIENT
Start: 2024-10-17 | End: 2024-10-17

## 2024-10-17 RX ORDER — ACETAMINOPHEN 325 MG
650 TABLET ORAL ONCE
Refills: 0 | Status: COMPLETED | OUTPATIENT
Start: 2024-10-17 | End: 2024-10-17

## 2024-10-17 RX ORDER — SODIUM CHLORIDE 0.9 % (FLUSH) 0.9 %
1000 SYRINGE (ML) INJECTION ONCE
Refills: 0 | Status: COMPLETED | OUTPATIENT
Start: 2024-10-17 | End: 2024-10-17

## 2024-10-17 RX ORDER — METOCLOPRAMIDE HCL 5 MG
10 TABLET ORAL ONCE
Refills: 0 | Status: COMPLETED | OUTPATIENT
Start: 2024-10-17 | End: 2024-10-17

## 2024-10-17 RX ORDER — MECLIZINE HYDROCLORIDE 25 MG/1
1 TABLET ORAL
Qty: 56 | Refills: 0
Start: 2024-10-17 | End: 2024-10-30

## 2024-10-17 RX ADMIN — MECLIZINE HYDROCLORIDE 25 MILLIGRAM(S): 25 TABLET ORAL at 14:51

## 2024-10-17 RX ADMIN — Medication 650 MILLIGRAM(S): at 13:14

## 2024-10-17 RX ADMIN — Medication 4 MILLIGRAM(S): at 13:14

## 2024-10-17 RX ADMIN — Medication 10 MILLIGRAM(S): at 13:14

## 2024-10-17 RX ADMIN — Medication 1000 MILLILITER(S): at 13:15

## 2024-10-17 NOTE — ED PROVIDER NOTE - CLINICAL SUMMARY MEDICAL DECISION MAKING FREE TEXT BOX
39-year-old female with PMH of pituitary cyst/adenoma, migraines, hepatic steatosis, and cholelithiasis BIBEMS complaining of dizziness, shortness breath, chest pain, and vomiting worse this morning.   Physical exam, labs, CTh, and chest x-ray unremarkable. In the ED, patient  received 1 L NS,  Tylenol, Reglan, Decadron, meclizine with noted improvement.   Patient is likely experiencing symptoms from vertigo (especially given her recent URI), migraine, and gallstones.  ICH, hydrocephalus, or worsening intracranial, electrolyte abnormalities, pneumonia, pregnancy, anemia ruled out.  Patient advised to eat a simple low-fat diet and follow-up with her general surgeon to discuss a cholecystectomy.  Patient also advised to make a neurology appointment to discuss migraine maintenance treatment.  Prescription for meclizine sent to pharmacy.  Return precautions explained and patient agreed.

## 2024-10-17 NOTE — ED PROVIDER NOTE - NSFOLLOWUPINSTRUCTIONS_ED_ALL_ED_FT
Your symptoms are due to a combination of conditions–migraine, vertigo, and gallstones. Take Tylenol Motrin for headache as needed.  Take meclizine for dizziness as needed.  Eat a simple help the lean diet to avoid activating your gallstones.  Avoid any rich or fatty foods.  Follow-up with the general  surgeon to discuss gallbladder removal.  follow-up with neurology to discuss medication for your migraines.     Vertigo  The outer and inner ear showing the eardrum and ear canal.  Vertigo is the feeling that you or the things around you are moving or spinning when they're not. It's different than feeling dizzy. It can also cause:  Loss of balance.  Trouble standing or walking.  Nausea and vomiting.  This feeling can come and go at any time. It can last from a few seconds to minutes or even hours. It may go away on its own or be treated with medicine.    What are the types of vertigo?  There are two types of vertigo:  Peripheral vertigo happens when parts of your inner ear don't work like they should. This is the more common type.  Central vertigo happens when your brain and spinal cord don't work like they should.  Your health care provider will do tests to find out what kind of vertigo you have. This will help them decide on the right treatment for you.    Follow these instructions at home:  Eating and drinking    Drink enough fluid to keep your pee (urine) pale yellow.  Do not drink alcohol.  Activity    When you get up in the morning, first sit up on the side of the bed. When you feel okay, stand slowly while holding onto something.  Move slowly. Avoid sudden body or head movements.  Avoid certain positions, as told by your provider.  Use a cane if you have trouble standing or walking.  Sit down right away if you feel unsteady.  Place items in your home so they're easy for you to reach without bending or leaning over.  Return to normal activities when you're told. Ask what things are safe for you to do.  General instructions    Take your medicines only as told by your provider.  Contact a health care provider if:  Your medicines don't help or make your vertigo worse.  You get new symptoms.  You have a fever.  You have nausea or vomiting.  Your family or friends spot any changes in how you're acting.  A part of your body goes numb.  You feel tingling and prickling in a part of your body.  You get very bad headaches.  Get help right away if:  You're always dizzy or you faint.  You have a stiff neck.  You have trouble moving or speaking.  Your hands, arms, or legs feel weak.  Your hearing or eyesight changes.  These symptoms may be an emergency. Call 911 right away.  Do not wait to see if the symptoms will go away.  Do not drive yourself to the hospital.    Gallstones    WHAT YOU NEED TO KNOW:    What are gallstones? Gallstones are hard substances that form in your gallbladder or bile duct. Your gallbladder and bile duct are located on the right side of your abdomen, near your liver. Your gallbladder stores bile. Bile helps break down the fat that you eat. Your gallbladder also helps remove certain chemicals from your body.  Gallstones    What causes gallstones? Gallstones develop when your gallbladder does not empty correctly. Stones can form from different bile materials. The following may increase your risk:    Obesity or not enough physical activity    Pregnancy    A family history of gallstones    A health condition such as diabetes, cirrhosis, or nonalcoholic fatty liver disease    Rapid weight loss    Surgery on your intestines    Certain medicines, such as estrogen, antibiotics, and cholesterol-lowering medicines  What are the signs and symptoms of gallstones? The most common symptom is severe, constant pain in the right upper abdomen. It is usually just below the ribcage. The pain may also be felt in the right shoulder and between the shoulder blades. You may also have any of the following:    Nausea and vomiting    Feeling bloated    Presley-colored bowel movements    Dark urine  How are gallstones diagnosed? Ultrasound pictures may be used to check for gallstones. If these tests do not show clear signs of gallstones, you may need a test that uses contrast liquid. Examples include a gallbladder scan, endoscopic retrograde cholangiopancreatography (ERCP), and an oral cholecystography. For any of these tests, tell your healthcare provider if you have ever had an allergic reaction to contrast liquid. If you are a woman, tell your healthcare provider if there is a chance you may be pregnant.    How are gallstones treated? You may not need treatment if you do not have signs or symptoms. Your healthcare provider may want to monitor the gallstones over time. You may need any of the following:    Antinausea medicine may be given to calm your stomach and to help prevent vomiting.    Prescription pain medicine may be given. Ask your healthcare provider how to take this medicine safely. Some prescription pain medicines contain acetaminophen. Do not take other medicines that contain acetaminophen without talking to your healthcare provider. Too much acetaminophen may cause liver damage. Prescription pain medicine may cause constipation. Ask your healthcare provider how to prevent or treat constipation.    Surgery may be needed to remove your gallbladder. This may be done after symptoms become severe or you develop health problems from the gallstones. Your healthcare provider may recommend gallbladder removal before you develop symptoms. This may be done if you are at high risk for gallstones or health problems they can cause.  What can I do to manage or prevent gallstones?    Eat a variety of healthy foods. This may help you have more energy and heal faster. Healthy foods include fruits, vegetables, whole-grain breads, low-fat dairy products, beans, lean meat, and fish. Ask if you need to be on a special diet. Try to eat regular meals during the day. This will help your gallbladder empty.  Healthy Foods      Exercise as directed. Talk to your healthcare provider about the best exercise plan for you. Exercise can help you lose weight and improve your health.   FAMILY WALKING FOR EXERCISE      Manage your weight. If you are overweight, it is important to reach a healthy weight. You will need to lose weight slowly because rapid weight loss can increase your risk for gallstones. Talk to your healthcare provider about your weight. He or she can help you create a safe weight loss plan if you need to lose weight.  When should I seek immediate care?    You have a fever and chills.    Your eyes or skin turn yellow.    You have severe pain in your upper abdomen, just below the right ribcage.  When should I call my doctor?    You have nausea and are vomiting.    Your urine is dark.    You have presley-colored bowel movements.    You have questions or concerns about your condition or care.

## 2024-10-17 NOTE — ED ADULT TRIAGE NOTE - CHIEF COMPLAINT QUOTE
Pt BIBA c/o dizziness and shortness of breath since this AM. Pt states she had rhinovirus and the flu last week. EMS inserted 20G into L hand and administered 100mL of normal saline.

## 2024-10-17 NOTE — ED ADULT NURSE NOTE - OBJECTIVE STATEMENT
Pt presents to ED A&Ox4 with multiple medical complaints. Pt reports experiencing SOB with cough and congestion that began approximately 1 month ago while pt was at [facility]. Pt also c/o generalized chest pain and generalized abdominal pain with several episodes of N/V. Pt reports N/V is not exacerbated with oral intake. Pt also reports new onset dizziness and several syncopal episodes during previous hospital visits. Denies PMH or prescribed medications. Denies chances of pregnancy; LMP 10/14. Skin is warm, dry, and color WNL. Respirations equal and unlabored; pt able to speak in full sentences.

## 2024-10-17 NOTE — ED PROVIDER NOTE - NS ED ROS FT
Patient denies fever, trauma, visual disturbances,  abdominal pain, diarrhea, constipation, dysuria, hematuria, alcohol/smoking/drug use.

## 2024-10-17 NOTE — ED ADULT NURSE NOTE - NSFALLUNIVINTERV_ED_ALL_ED
Bed/Stretcher in lowest position, wheels locked, appropriate side rails in place/Call bell, personal items and telephone in reach/Instruct patient to call for assistance before getting out of bed/chair/stretcher/Non-slip footwear applied when patient is off stretcher/[place] to call system/Physically safe environment - no spills, clutter or unnecessary equipment/Purposeful proactive rounding/Room/bathroom lighting operational, light cord in reach

## 2024-10-17 NOTE — ED PROVIDER NOTE - OBJECTIVE STATEMENT
39-year-old female with PMH of pituitary cyst/adenoma, migraines, hepatic steatosis, and cholelithiasis BIBEMS complaining of dizziness, shortness breath, chest pain, and vomiting worse this morning. Patient  says she was admitted on 10/3/2024 at [facility] for migraine, syncope,  shortness of breath, chest pressure symptoms and provides paperwork.  Patient had an extensive workup done including CT angio to rule out PE, CTh, CT abdomen/pelvis which showed she had rhinovirus/enterovirus, gallstones,  and a stable pituitary adenoma.  Patient has a general surgery appointment on 10/25/2024 to discuss a cholecystectomy and was unable to get a neurology appointment until December 2024. Patient followed up with a cardiologist who will be doing more tests. However, patient says that ever since she was hospitalized, she has been having persistent dizziness, headaches, shortness of breath, and chest pain. Today while she was at work, the sensation got worse.  Patient was sitting down at her computer when she suddenly started feeling dizzy, had a headache, and had 5 episodes of nonbloody emesis. Patient says she had a ham and cheese sandwich earlier this morning. Patient is also having intermittent sob and chest pain, usually when she vomits.

## 2024-10-17 NOTE — ED PROVIDER NOTE - PHYSICAL EXAMINATION
VITAL SIGNS: I have reviewed nursing notes and confirm.  CONSTITUTIONAL: Well-developed; well-nourished; in no acute distress.  SKIN: Skin is warm and dry, no acute rash.  HEAD: Normocephalic; atraumatic.  EYES: Pupils round bilaterally, 3mm; conjunctiva and sclera clear. No nystagmus  ENT: No nasal discharge; airway clear, MMM.  NECK: Supple; non tender.  CARD: S1, S2 normal; no murmurs, gallops, or rubs. Regular rate and rhythm.  RESP: No wheezes, rales or rhonchi.  : No CVAT tenderness bilaterally   ABD: Soft; non-distended; non-tender; no rebound or guarding.  EXT: Normal ROM. No clubbing, cyanosis or edema.  NEURO: Alert, orientedx3. Grossly unremarkable. Cranial nerves intact. Finger to nose test normal. Normal gait. 5/5 strength in upper and lower extremities. FISHER, normal tone, no gross motor or sensory changes. Fluent speech.   PSYCH: Cooperative, appropriate. Mood and affect wnl.

## 2024-10-17 NOTE — ED ADULT NURSE NOTE - CHIEF COMPLAINT QUOTE
7/29/2019         RE: Erika Reina  [id number]  [facility] [address]        Dear Colleague,    Thank you for referring your patient, Erika Reina, to the [facility]. Please see a copy of my visit note below.    Medical Assistant Note:  Erika Reina presents today for lab draw and urine collection.    Patient seen by provider today: No.   present during visit today: Not Applicable.    Concerns: No Concerns.    Procedure:  Lab draw site: left antecub, Needle type: butterfly, Gauge: 23.    Post Assessment:  Labs drawn without difficulty: Yes.    Discharge Plan:  Departure Mode: Ambulatory.    Face to Face Time: 15.    [facility]              Again, thank you for allowing me to participate in the care of your patient.        Sincerely,        [facility] Oncology Nurse    
Pt BIBA c/o dizziness and shortness of breath since this AM. Pt states she had rhinovirus and the flu last week. EMS inserted 20G into L hand and administered 100mL of normal saline.

## 2024-10-17 NOTE — ED PROVIDER NOTE - PATIENT PORTAL LINK FT
You can access the FollowMyHealth Patient Portal offered by [facility] by registering at the following website: [email or web address]. By joining [facility] FollowMyHealth portal, you will also be able to view your health information using other applications (apps) compatible with our system.

## 2024-10-18 PROBLEM — E23.6 OTHER DISORDERS OF PITUITARY GLAND: Chronic | Status: ACTIVE | Noted: 2022-10-11

## 2024-10-21 DIAGNOSIS — Z86.69 PERSONAL HISTORY OF OTHER DISEASES OF THE NERVOUS SYSTEM AND SENSE ORGANS: ICD-10-CM

## 2024-10-21 DIAGNOSIS — R42 DIZZINESS AND GIDDINESS: ICD-10-CM

## 2024-10-21 DIAGNOSIS — R07.9 CHEST PAIN, UNSPECIFIED: ICD-10-CM

## 2024-10-21 DIAGNOSIS — R06.02 SHORTNESS OF BREATH: ICD-10-CM

## 2024-10-21 DIAGNOSIS — Z87.19 PERSONAL HISTORY OF OTHER DISEASES OF THE DIGESTIVE SYSTEM: ICD-10-CM
